# Patient Record
Sex: MALE | Race: WHITE
[De-identification: names, ages, dates, MRNs, and addresses within clinical notes are randomized per-mention and may not be internally consistent; named-entity substitution may affect disease eponyms.]

---

## 2020-05-11 ENCOUNTER — HOSPITAL ENCOUNTER (OUTPATIENT)
Dept: HOSPITAL 75 - PREOP | Age: 65
Discharge: HOME | End: 2020-05-11
Attending: INTERNAL MEDICINE
Payer: MEDICARE

## 2020-05-11 VITALS — WEIGHT: 170.2 LBS | BODY MASS INDEX: 24.64 KG/M2 | HEIGHT: 69.69 IN

## 2020-05-11 DIAGNOSIS — Z11.59: ICD-10-CM

## 2020-05-11 DIAGNOSIS — Z01.818: Primary | ICD-10-CM

## 2020-05-11 PROCEDURE — 87635 SARS-COV-2 COVID-19 AMP PRB: CPT

## 2020-05-14 NOTE — HISTORY AND PHYSICAL
DATE OF SERVICE:  



COLONOSCOPY HISTORY AND PHYSICAL



HISTORY:

The patient is a 65-year-old white male who I initially saw on 03/12/2020 for

surveillance colonoscopy referral from Dr. Sneed.  He has a history of colon

polyps and a family history of colon cancer, index case being his grandmother

who was diagnosed at the age of 70.  His procedure was put off secondary to

COVID issues.  In the interval, there have been no changes in his health

history.  He feels well, no chest pain, shortness of breath or abdominal pain.



PHYSICAL EXAMINATION:

GENERAL:  Reveals a well-appearing white male, in no acute distress.

VITAL SIGNS:  Blood pressure 140/82.

CHEST:  Clear.

CARDIOVASCULAR:  Regular rate and rhythm without murmur, S3 or S4.

EXTREMITIES:  Reveal no cyanosis, clubbing or edema.

ABDOMEN:  Nontender, nondistended.



ASSESSMENT:

The patient is set up for surveillance colonoscopy on 05/15/2020.



Thank you for the referral.





Job ID: [id number]

DocumentID: [id number]

Dictated Date:  05/14/2020 14:01:56

Transcription Date: 05/14/2020 14:15:07

Dictated By: CHAITANYA CHUN MD

## 2020-05-15 ENCOUNTER — HOSPITAL ENCOUNTER (OUTPATIENT)
Dept: HOSPITAL 75 - ENDO | Age: 65
Discharge: HOME | End: 2020-05-15
Attending: INTERNAL MEDICINE
Payer: MEDICARE

## 2020-05-15 VITALS — DIASTOLIC BLOOD PRESSURE: 111 MMHG | SYSTOLIC BLOOD PRESSURE: 166 MMHG

## 2020-05-15 VITALS — DIASTOLIC BLOOD PRESSURE: 84 MMHG | SYSTOLIC BLOOD PRESSURE: 136 MMHG

## 2020-05-15 VITALS — DIASTOLIC BLOOD PRESSURE: 95 MMHG | SYSTOLIC BLOOD PRESSURE: 145 MMHG

## 2020-05-15 VITALS — DIASTOLIC BLOOD PRESSURE: 75 MMHG | SYSTOLIC BLOOD PRESSURE: 140 MMHG

## 2020-05-15 VITALS — SYSTOLIC BLOOD PRESSURE: 167 MMHG | DIASTOLIC BLOOD PRESSURE: 94 MMHG

## 2020-05-15 VITALS — SYSTOLIC BLOOD PRESSURE: 136 MMHG | DIASTOLIC BLOOD PRESSURE: 84 MMHG

## 2020-05-15 VITALS — SYSTOLIC BLOOD PRESSURE: 147 MMHG | DIASTOLIC BLOOD PRESSURE: 84 MMHG

## 2020-05-15 VITALS — DIASTOLIC BLOOD PRESSURE: 86 MMHG | SYSTOLIC BLOOD PRESSURE: 143 MMHG

## 2020-05-15 VITALS — DIASTOLIC BLOOD PRESSURE: 83 MMHG | SYSTOLIC BLOOD PRESSURE: 140 MMHG

## 2020-05-15 VITALS — DIASTOLIC BLOOD PRESSURE: 85 MMHG | SYSTOLIC BLOOD PRESSURE: 143 MMHG

## 2020-05-15 VITALS — SYSTOLIC BLOOD PRESSURE: 174 MMHG | DIASTOLIC BLOOD PRESSURE: 93 MMHG

## 2020-05-15 VITALS — DIASTOLIC BLOOD PRESSURE: 93 MMHG | SYSTOLIC BLOOD PRESSURE: 161 MMHG

## 2020-05-15 DIAGNOSIS — K63.5: Primary | ICD-10-CM

## 2020-05-15 DIAGNOSIS — Z88.1: ICD-10-CM

## 2020-05-15 DIAGNOSIS — K64.1: ICD-10-CM

## 2020-05-15 DIAGNOSIS — Z86.010: ICD-10-CM

## 2020-05-15 DIAGNOSIS — K57.30: ICD-10-CM

## 2020-05-15 DIAGNOSIS — Z80.0: ICD-10-CM

## 2020-05-15 RX ADMIN — MIDAZOLAM PRN MG: 1 INJECTION INTRAMUSCULAR; INTRAVENOUS at 07:56

## 2020-05-15 RX ADMIN — MIDAZOLAM PRN MG: 1 INJECTION INTRAMUSCULAR; INTRAVENOUS at 08:05

## 2020-05-15 RX ADMIN — MIDAZOLAM PRN MG: 1 INJECTION INTRAMUSCULAR; INTRAVENOUS at 08:15

## 2020-05-15 RX ADMIN — MIDAZOLAM PRN MG: 1 INJECTION INTRAMUSCULAR; INTRAVENOUS at 08:10

## 2020-05-15 NOTE — PRE-OP NOTE & CONSCIOUS SEDAT
Pre-Operative Progress Note


H&P Reviewed


The H&P was reviewed, patient examined and no changes noted.


Date H&P Reviewed:  May 15, 2020


Time H&P Reviewed:  07:40





Conscious Sedation Pre-Proced


ASA Score


2


For ASA 3 and 4: Consider anesthesia and medical clearance. Also, for patients

with a history of failed moderate sedation consider anesthesia.

















Airway 


 


Lungs 


 


Heart 


 


 ASA score


 


 ASA 1: a normal healthy patient


 


 ASA 2:  a patient with a mild systemic disease (mid diabetes, controlled 

hypertension, obesity 


 


 ASA 3:  a patient with a severe systemic disease that limits activity  (angina,

COPD, prior Myocardial infarction)


 


 ASA 4:  a patient with an incapacitating disease that is a constant threat to 

life (CHF, renal failure)


 


 ASA 5:  a moribund patient not expected to survive 24 hrs.  (ruptured aneurysm)


 


 ASA 6:  a declared brain-dead patient whose organs are being harvested.


 


 For emergent operations, add the letter E after the classification











Mallampati Classification


Grade 2





Sedation Plan


Analgesia, Amnesia, Plan communicated to team members, Discussed options with 

patient/fam, Discussed risks with patient/fam


The patient is an appropriate candidate to undergo the planned procedure, 

sedation, and anesthesia.





The patient immediately re-assessed prior to indication.











CHAITANYA CHUN MD              May 15, 2020 07:50

## 2020-05-15 NOTE — OPERATIVE REPORT
DATE OF SERVICE:  



COLONOSCOPY SUMMARY



INDICATION FOR THE PROCEDURE:

Surveillance colonoscopy due to past history of colon polyps.



The patient was placed in the left lateral decubitus position.  Prior to

undergoing colonoscopy, digital rectal evaluation was performed.  Prostate is

mildly enlarged, anodular and nontender to digital inspection.  Digital and

endoscopic findings are compatible with a grade II internal hemorrhoid noted at

the 5 o'clock position.  No other abnormalities noted on digital inspection of

anal canal or distal rectal vault.  The colonoscope was then inserted into the

rectum and advanced to the cecum.  Quality of prep was fair.



FINDINGS:

There was one grade II internal hemorrhoid complex noted at the 5 o'clock

position, nonthrombosed and nontender on digital inspection.  The rectum was

unremarkable.  There were moderate number of small to moderate sized

diverticulum present in the sigmoid colon, descending colon and transverse colon

without evidence for diverticulitis.  No evidence for neoplasia were noted in

these locations.  Present in the mid ascending colon was a 4 mm sessile polyp

was biopsied and ablated and submitted for histopathology.  Remainder of the

ascending colon and cecum was unremarkable.



ASSESSMENT:

One diminutive polyp was removed from the mid ascending colon.  As long as there

are no surprise on histopathology report, would advocate consideration for

repeat surveillance colonoscopy in 5 years.  Digital rectal evaluation was

compatible with mild BPH.  The patient does have moderate diverticular disease

involving the sigmoid colon, descending colon and transverse colon without

evidence for diverticulitis.



I thank you for the referral of this pleasant gentleman.





Job ID: [id number]

DocumentID: [id number]

Dictated Date:  05/15/2020 12:11:03

Transcription Date: 05/15/2020 18:11:15

Dictated By: CHAITANYA CHUN MD